# Patient Record
Sex: MALE | Race: WHITE
[De-identification: names, ages, dates, MRNs, and addresses within clinical notes are randomized per-mention and may not be internally consistent; named-entity substitution may affect disease eponyms.]

---

## 2018-01-13 ENCOUNTER — HOSPITAL ENCOUNTER (EMERGENCY)
Dept: HOSPITAL 80 - FED | Age: 23
Discharge: HOME | End: 2018-01-13
Payer: COMMERCIAL

## 2018-01-13 VITALS — HEART RATE: 64 BPM | DIASTOLIC BLOOD PRESSURE: 84 MMHG | SYSTOLIC BLOOD PRESSURE: 128 MMHG | RESPIRATION RATE: 18 BRPM

## 2018-01-13 VITALS — TEMPERATURE: 98.6 F | OXYGEN SATURATION: 97 %

## 2018-01-13 DIAGNOSIS — S46.911A: Primary | ICD-10-CM
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
## 2018-01-13 DIAGNOSIS — F17.200: ICD-10-CM
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
## 2018-01-13 NOTE — EDPHY
H & P


Stated Complaint: R shoulder pain snowboarding yesterday no fall but twist- 

tweak motion





- Personal History


Current Tetanus/Diphtheria Vaccine: Unsure


Current Tetanus Diphtheria and Acellular Pertussis (TDAP): Unsure





- Medical/Surgical History


Hx Asthma: No


Hx Chronic Respiratory Disease: No


Hx Diabetes: No


Hx Cardiac Disease: No


Hx Renal Disease: No


Hx Cirrhosis: No


Hx Alcoholism: No


Hx HIV/AIDS: No


Hx Splenectomy or Spleen Trauma: No


Other PMH: denies





- Social History


Smoking Status: Current every day smoker


HPI/ROS: 





Chief complaint:  Right shoulder injury





History of present illness:  This is a 22-year-old male who presents to the 

emergency department for right shoulder injury. Patient was snowboarding 

yesterday, he went to sent back, put his arm out to brace him sitting down and 

jammed his shoulder.  Since then he has had pain in the shoulder.  

Progressively worsening.  It is making it difficult to move the shoulder today.

  He denies other associated signs or symptoms including no direct trauma to 

the shoulder, no report of abnormal coolness or paresthesias in the shoulder or 

right arm.  No report of pain or trauma to the head or neck.  No other 

complaints. (Erik Arrieta)





- Physical Exam


Exam: 





General Appearance: Alert, nontoxic.


Eyes: Pupils equal and round no injection.


Respiratory: Chest is non tender, lungs are clear to auscultation.


Cardiovascular: regular rate and rhythm. Radial pulses 2+.


Musculoskeletal:  The head is nontender. Neck is supple, good range of motion 

and non tender.  The spine is nontender to palpation along its entire length.  

There is tenderness over the right AC joint as well as the lateral aspect of 

the shoulder.  He has decreased active range of motion above 90 degrees.  

However he has good passive range of motion.  The rest of the right upper 

extremity is unremarkable.


Skin:  No rashes or lesions.


Neurological:  Alert and oriented x4. Strength and sensation intact and 

symmetrical including the right upper extremity.


 (Erik Arrieta)


Constitutional: 





 Initial Vital Signs











Temperature (C)  37.0 C   01/13/18 17:58


 


Heart Rate  76   01/13/18 17:58


 


Respiratory Rate  16   01/13/18 17:58


 


Blood Pressure  150/78 H  01/13/18 17:58


 


O2 Sat (%)  97   01/13/18 17:58








 











O2 Delivery Mode               Room Air














Allergies/Adverse Reactions: 


 





No Known Allergies Allergy (Unverified 01/13/18 17:57)


 








Home Medications: 














 Medication  Instructions  Recorded


 


NK [No Known Home Meds]  01/13/18














Medical Decision Making





- Diagnostics


Imaging: I viewed and interpreted images myself


Procedures: 





Procedure:  Splint placement.





A sling was applied.  After application of the splint I returned and re-

examined the patient.  The splint was adequately immobilizing the joint and 

distal to the splint the patient's circulation and sensation was intact. (Erik Arrieta)


ED Course/Re-evaluation: 





Patient is seen under the supervision of my secondary supervising physician Dr. Aleksandra Ayala.  Patient presents to the emergency department for a right 

shoulder injury. X-rays are negative.  The arm appears neurovascularly intact.  

I suspect a soft tissue injury. He is placed in a sling.  Home care is 

discussed.  He is referred to Orthopedics for recheck.  Return precautions are 

given.  Patient voiced understanding and agreement with plan. (Erik Arrieta)


Differential Diagnosis: 





Included but not limited to contusion, sprain, strain, bony fracture, joint 

dislocation (Erik Arrieta)


Other Provider: 





The patient was evaluated and managed by the Physician Assistant.My co-

signature indicates that I have reviewed this chart and I agree with the 

findings and plan of care as documented. I am the secondary supervising 

physician. (Aleksandra Ayala)





- Data Points


Medications Given: 





 








Discontinued Medications





Acetaminophen (Tylenol)  1,000 mg PO EDNOW ONE


   Stop: 01/13/18 18:42


   Last Admin: 01/13/18 18:47 Dose:  1,000 mg


Ibuprofen (Motrin)  600 mg PO EDNOW ONE


   Stop: 01/13/18 18:42


   Last Admin: 01/13/18 18:47 Dose:  600 mg








Departure





- Departure


Disposition: Home, Routine, Self-Care


Clinical Impression: 


Shoulder strain


Qualifiers:


 Encounter type: initial encounter Laterality: right Qualified Code(s): 

S46.911A - Strain of unspecified muscle, fascia and tendon at shoulder and 

upper arm level, right arm, initial encounter





Condition: Good


Instructions:  Shoulder Sprain (ED)


Additional Instructions: 


Follow-up with orthopedics this week for continued evaluation and care





Use ibuprofen 600 mg 3 times daily for the next 2-3 days for symptom control





In addition You can take a 1000 mg of Tylenol 3 times daily for the next 2-3 

days for symptom control





Ice the injury, 20 min on, 3 times daily for the next 2 days





If symptoms worsen or new symptoms develop return to the emergency room for 

recheck


Referrals: 


Gaurav Ford MD [Medical Doctor] - As per Instructions